# Patient Record
Sex: FEMALE | Race: OTHER | NOT HISPANIC OR LATINO | URBAN - METROPOLITAN AREA
[De-identification: names, ages, dates, MRNs, and addresses within clinical notes are randomized per-mention and may not be internally consistent; named-entity substitution may affect disease eponyms.]

---

## 2019-08-31 ENCOUNTER — EMERGENCY (EMERGENCY)
Facility: HOSPITAL | Age: 44
LOS: 1 days | Discharge: HOME | End: 2019-08-31
Attending: EMERGENCY MEDICINE
Payer: SELF-PAY

## 2019-08-31 VITALS
DIASTOLIC BLOOD PRESSURE: 86 MMHG | HEIGHT: 64 IN | HEART RATE: 91 BPM | RESPIRATION RATE: 20 BRPM | TEMPERATURE: 98 F | WEIGHT: 134.92 LBS | SYSTOLIC BLOOD PRESSURE: 129 MMHG | OXYGEN SATURATION: 98 %

## 2019-08-31 PROCEDURE — 73110 X-RAY EXAM OF WRIST: CPT

## 2019-08-31 PROCEDURE — 73562 X-RAY EXAM OF KNEE 3: CPT

## 2019-08-31 PROCEDURE — 29125 APPL SHORT ARM SPLINT STATIC: CPT

## 2019-08-31 PROCEDURE — 99283 EMERGENCY DEPT VISIT LOW MDM: CPT | Mod: 25

## 2019-08-31 PROCEDURE — 73110 X-RAY EXAM OF WRIST: CPT | Mod: 26,LT

## 2019-08-31 PROCEDURE — 73562 X-RAY EXAM OF KNEE 3: CPT | Mod: 26,LT

## 2019-08-31 PROCEDURE — 99283 EMERGENCY DEPT VISIT LOW MDM: CPT

## 2019-08-31 RX ORDER — IBUPROFEN 200 MG
600 TABLET ORAL ONCE
Refills: 0 | Status: COMPLETED | OUTPATIENT
Start: 2019-08-31 | End: 2019-08-31

## 2019-08-31 RX ADMIN — Medication 600 MILLIGRAM(S): at 17:57

## 2019-08-31 NOTE — ED ADULT NURSE NOTE - NSIMPLEMENTINTERV_GEN_ALL_ED
Implemented All Fall Risk Interventions:  [place] to call system. Call bell, personal items and telephone within reach. Instruct patient to call for assistance. Room bathroom lighting operational. Non-slip footwear when patient is off stretcher. Physically safe environment: no spills, clutter or unnecessary equipment. Stretcher in lowest position, wheels locked, appropriate side rails in place. Provide visual cue, wrist band, yellow gown, etc. Monitor gait and stability. Monitor for mental status changes and reorient to person, place, and time. Review medications for side effects contributing to fall risk. Reinforce activity limits and safety measures with patient and family.

## 2019-08-31 NOTE — ED PROVIDER NOTE - CARE PLAN
Principal Discharge DX:	Knee injury, left, initial encounter  Secondary Diagnosis:	Wrist pain, acute, left

## 2019-08-31 NOTE — ED PROVIDER NOTE - PHYSICAL EXAMINATION
L wrist limited ROM due to pain. Ulnar styloid tenderness. No distal radial or snuffbox tenderness. No other tenderness along the forearm. L elbow full range of motion without swelling or tenderness to palpation. L knee full range of motion with no swelling and some proximal fibular tenderness. No spinal/paraspinal tenderness to palpation.

## 2019-08-31 NOTE — ED PROVIDER NOTE - PROGRESS NOTE DETAILS
Xrasy show no acute findings. Discussed incidental findings. Wrist splint applied to wrist. ACE wrap applied to L knee. Cane provided. Will need to follow up with orthopedist within 1 week. Pt is well appearing walking with steady gait, stable for discharge and follow up without fail with medical doctor. I had a detailed discussion with the patient and/or guardian regarding the historical points, exam findings, and any diagnostic results supporting the discharge diagnosis. Pt educated on care and need for follow up. Strict return instructions and red flag signs and symptoms discussed with patient. Questions answered. Pt shows understanding of discharge information and agrees to follow.

## 2019-08-31 NOTE — ED PROVIDER NOTE - ATTENDING CONTRIBUTION TO CARE
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

## 2019-08-31 NOTE — ED PROVIDER NOTE - OBJECTIVE STATEMENT
44 year-old female, no significant PMHx, presents with cc fall. 2 days ago was in a store and slipped and fell onto her L side. Broke fall with forearm and reports that landed flat on the Left forearm and L knee. [place] that may have twisted L wrist during fall. No head injury or LOC. Since fall c/o of severe distal ulnar pain, continuous, worse with movement, no alleviating factors. Associated with slight tingling to the tips of the index and middle fingers. Also reports lateral knee pain, severe, worse with walking, not alleviated with Tylenol at home. Denies any other injuries or complaints.

## 2019-08-31 NOTE — ED PROVIDER NOTE - PATIENT PORTAL LINK FT
You can access the FollowMyHealth Patient Portal offered by [facility] by registering at the following website: [email or web address]. By joining [facility]’s FollowMyHealth portal, you will also be able to view your health information using other applications (apps) compatible with our system.
